# Patient Record
Sex: FEMALE | Race: WHITE
[De-identification: names, ages, dates, MRNs, and addresses within clinical notes are randomized per-mention and may not be internally consistent; named-entity substitution may affect disease eponyms.]

---

## 2017-01-19 ENCOUNTER — HOSPITAL ENCOUNTER (EMERGENCY)
Dept: HOSPITAL 45 - C.EDB | Age: 77
Discharge: HOME | End: 2017-01-19
Payer: COMMERCIAL

## 2017-01-19 VITALS
HEIGHT: 67.01 IN | BODY MASS INDEX: 18.44 KG/M2 | WEIGHT: 117.51 LBS | WEIGHT: 117.51 LBS | BODY MASS INDEX: 18.44 KG/M2 | HEIGHT: 67.01 IN

## 2017-01-19 VITALS — OXYGEN SATURATION: 97 % | HEART RATE: 49 BPM | SYSTOLIC BLOOD PRESSURE: 112 MMHG | DIASTOLIC BLOOD PRESSURE: 60 MMHG

## 2017-01-19 VITALS — TEMPERATURE: 97.34 F

## 2017-01-19 VITALS — OXYGEN SATURATION: 98 %

## 2017-01-19 DIAGNOSIS — R42: ICD-10-CM

## 2017-01-19 DIAGNOSIS — E78.5: ICD-10-CM

## 2017-01-19 DIAGNOSIS — F20.0: ICD-10-CM

## 2017-01-19 DIAGNOSIS — F32.9: ICD-10-CM

## 2017-01-19 DIAGNOSIS — E86.0: Primary | ICD-10-CM

## 2017-01-19 LAB
ALBUMIN/GLOB SERPL: 1.2 {RATIO} (ref 0.9–2)
ALP SERPL-CCNC: 61 U/L (ref 45–117)
ALT SERPL-CCNC: 24 U/L (ref 12–78)
ANION GAP SERPL CALC-SCNC: 10 MMOL/L (ref 3–11)
APPEARANCE UR: CLEAR
AST SERPL-CCNC: 23 U/L (ref 15–37)
BASOPHILS # BLD: 0.02 K/UL (ref 0–0.2)
BASOPHILS NFR BLD: 0.5 %
BILIRUB UR-MCNC: (no result) MG/DL
BUN SERPL-MCNC: 31 MG/DL (ref 7–18)
BUN/CREAT SERPL: 28.5 (ref 10–20)
CALCIUM SERPL-MCNC: 9 MG/DL (ref 8.5–10.1)
CHLORIDE SERPL-SCNC: 108 MMOL/L (ref 98–107)
CKMB/CK RATIO: 1.6 (ref 0–3)
CO2 SERPL-SCNC: 27 MMOL/L (ref 21–32)
COLOR UR: YELLOW
COMPLETE: YES
CREAT CL PREDICTED SERPL C-G-VRATE: 35.5 ML/MIN
CREAT SERPL-MCNC: 1.1 MG/DL (ref 0.6–1.2)
EOSINOPHIL NFR BLD AUTO: 131 K/UL (ref 130–400)
GLOBULIN SER-MCNC: 2.7 GM/DL (ref 2.5–4)
GLUCOSE SERPL-MCNC: 88 MG/DL (ref 70–99)
HCT VFR BLD CALC: 34.3 % (ref 37–47)
IG%: 0 %
IMM GRANULOCYTES NFR BLD AUTO: 26.5 %
LYMPHOCYTES # BLD: 1.07 K/UL (ref 1.2–3.4)
MAGNESIUM SERPL-MCNC: 2.2 MG/DL (ref 1.8–2.4)
MANUAL MICROSCOPIC REQUIRED?: NO
MCH RBC QN AUTO: 31.9 PG (ref 25–34)
MCHC RBC AUTO-ENTMCNC: 33.2 G/DL (ref 32–36)
MCV RBC AUTO: 96.1 FL (ref 80–100)
MONOCYTES NFR BLD: 8.9 %
NEUTROPHILS # BLD AUTO: 1 %
NEUTROPHILS NFR BLD AUTO: 63.1 %
NITRITE UR QL STRIP: (no result)
PH UR STRIP: 6.5 [PH] (ref 4.5–7.5)
PMV BLD AUTO: 11.9 FL (ref 7.4–10.4)
POTASSIUM SERPL-SCNC: 4.1 MMOL/L (ref 3.5–5.1)
RBC # BLD AUTO: 3.57 M/UL (ref 4.2–5.4)
REVIEW REQ?: NO
SODIUM SERPL-SCNC: 145 MMOL/L (ref 136–145)
SP GR UR STRIP: 1.02 (ref 1–1.03)
URINE BILL WITH OR WITHOUT MIC: (no result)
UROBILINOGEN UR-MCNC: (no result) MG/DL
WBC # BLD AUTO: 4.04 K/UL (ref 4.8–10.8)

## 2017-01-19 NOTE — EMERGENCY ROOM VISIT NOTE
History


Report prepared by Courtney:  Siobhan Sancehz


Under the Supervision of:  Dr. Bernadette Milton D.O.


First contact with patient:  07:08


Chief Complaint:  DIZZY


Stated Complaint:  DIZZY,NAUSEA


Nursing Triage Summary:  


Pt states the dizziness started last evening.  Has had this in the past, states 


it was related to dehydration the last time.  Pt states she has been eating and 


drinking normally.





History of Present Illness


The patient is a 77 year old female who presents to the Emergency Room with 

complaints of persistent dizziness that began last evening. It started before 

she went to bed and persisted through the morning. Currently, she is feeling 

slightly better. She complains of slight nausea. She does report slight 

swelling to her bilateral legs. This morning, she ate a waffle and half of a 

grapefruit. The patient has had similar symptoms in the past, but states that 

the dizziness is always due to something different. During her most recent 

presentation to the ER with similar symptoms in December 2015, she was told 

that her symptoms were related to dehydration. Her weight is stable and she has 

been eating and drinking normally. Denies fevers, chills, chest pain, shortness 

of breath, vomiting, or other complaints. The patient does not have any health 

problems other than anxiety.





   Source of History:  patient


   Onset:  last night


   Position:  other (Global)


   Quality:  other (dizzy)


   Timing:  other (persistent)


   Associated Symptoms:  + nausea (slight), No SOB, No chest pain, No chills, 

No fevers, No vomiting





Review of Systems


See HPI for pertinent positives & negatives. A total of 10 systems reviewed and 

were otherwise negative.





Past Medical & Surgical


Medical Problems:


(1) Anemia


(2) Depressive disorder


(3) Dizziness


(4) Dizziness


(5) Hyperlipidemia


(6) Osteoarthrosis


(7) Paranoid Schizophrenia


Surgical Problems:


(1) Cataract Extraction Status


(2) Hip joint replacement status








Family History





FH: heart disease


FHx: cancer





Social History


Smoking Status:  Never Smoker


Alcohol Use:  none


Drug Use:  none


Marital Status:  


Housing Status:  lives alone


Occupation Status:  retired





Current/Historical Medications


Scheduled


Multivitamins/Minerals (Mvi With Minerals), 1 TAB PO DAILY


Risperidone (Risperdal), 1 MG PO HS





Allergies


Uncoded Allergies:  


     DUST MITES (Allergy, Unknown, unknown, 7/15/16)


 pt





Physical Exam


Vital Signs











  Date Time  Temp Pulse Resp B/P Pulse Ox O2 Delivery O2 Flow Rate FiO2


 


1/19/17 10:05  49 18 112/60 97   


 


1/19/17 08:25  47 18 110/50 99 Room Air  


 


1/19/17 07:19  48  108/49    





  49  111/52    





  59  123/52    


 


1/19/17 07:07  56      


 


1/19/17 06:57     98 Room Air  


 


1/19/17 06:50 36.3 52 20 115/63 98 Room Air  











Physical Exam


HEENT: Head - normocephalic and atraumatic   Pupils are equal, round, and 

reactive to light.  Extraocular eye muscles are intact, and sclera are 

anicteric.   Nose -  moist nasal mucosa without discharge. Mouth - dry buccal 

mucosa.  Oropharynx is nonerythematous and there is no tonsillar exudate or 

edema noted.


Neck: Supple; no JVD, nuchal rigidity, cervical lymphadenopathy.


Heart: Bradycardic rate and regular rhythm.  There is a normal S1 and S2 with 

no murmurs, clicks, or gallops appreciated.


Lungs: Clear to auscultation bilaterally with no wheezes, rales, or rhonchi.


Abdomen: Soft, completely nontender, nondistended, with good bowel sounds.  

There are no palpable pulsatile masses or hepatosplenomegaly.  There is no 

guarding, rigidity, or rebound noted.


Extremities: No evidence of cyanosis, clubbing, or edema.  There are easily 

palpable peripheral pulses.


Skin: warm and dry with poor turgor and no rashes.





Medical Decision & Procedures


Laboratory Results


1/19/17 07:10








Red Blood Count 3.57, Mean Corpuscular Volume 96.1, Mean Corpuscular Hemoglobin 

31.9, Mean Corpuscular Hemoglobin Concent 33.2, Mean Platelet Volume 11.9, 

Neutrophils (%) (Auto) 63.1, Lymphocytes (%) (Auto) 26.5, Monocytes (%) (Auto) 

8.9, Eosinophils (%) (Auto) 1.0, Basophils (%) (Auto) 0.5, Neutrophils # (Auto) 

2.55, Lymphocytes # (Auto) 1.07, Monocytes # (Auto) 0.36, Eosinophils # (Auto) 

0.04, Basophils # (Auto) 0.02





1/19/17 07:10

















Test


  1/19/17


07:10 1/19/17


08:25


 


White Blood Count


  4.04 K/uL


(4.8-10.8) 


 


 


Red Blood Count


  3.57 M/uL


(4.2-5.4) 


 


 


Hemoglobin


  11.4 g/dL


(12.0-16.0) 


 


 


Hematocrit 34.3 % (37-47)  


 


Mean Corpuscular Volume


  96.1 fL


() 


 


 


Mean Corpuscular Hemoglobin


  31.9 pg


(25-34) 


 


 


Mean Corpuscular Hemoglobin


Concent 33.2 g/dl


(32-36) 


 


 


Platelet Count


  131 K/uL


(130-400) 


 


 


Mean Platelet Volume


  11.9 fL


(7.4-10.4) 


 


 


Neutrophils (%) (Auto) 63.1 %  


 


Lymphocytes (%) (Auto) 26.5 %  


 


Monocytes (%) (Auto) 8.9 %  


 


Eosinophils (%) (Auto) 1.0 %  


 


Basophils (%) (Auto) 0.5 %  


 


Neutrophils # (Auto)


  2.55 K/uL


(1.4-6.5) 


 


 


Lymphocytes # (Auto)


  1.07 K/uL


(1.2-3.4) 


 


 


Monocytes # (Auto)


  0.36 K/uL


(0.11-0.59) 


 


 


Eosinophils # (Auto)


  0.04 K/uL


(0-0.5) 


 


 


Basophils # (Auto)


  0.02 K/uL


(0-0.2) 


 


 


RDW Standard Deviation


  51.5 fL


(36.4-46.3) 


 


 


RDW Coefficient of Variation


  14.6 %


(11.5-14.5) 


 


 


Immature Granulocyte % (Auto) 0.0 %  


 


Immature Granulocyte # (Auto)


  0.00 K/uL


(0.00-0.02) 


 


 


Anion Gap


  10.0 mmol/L


(3-11) 


 


 


Est Creatinine Clear Calc


Drug Dose 35.5 ml/min 


  


 


 


Estimated GFR (


American) 55.7 


  


 


 


Estimated GFR (Non-


American 48.1 


  


 


 


BUN/Creatinine Ratio 28.5 (10-20)  


 


Calcium Level


  9.0 mg/dl


(8.5-10.1) 


 


 


Magnesium Level


  2.2 mg/dl


(1.8-2.4) 


 


 


Total Bilirubin


  0.3 mg/dl


(0.2-1) 


 


 


Aspartate Amino Transf


(AST/SGOT) 23 U/L (15-37) 


  


 


 


Alanine Aminotransferase


(ALT/SGPT) 24 U/L (12-78) 


  


 


 


Alkaline Phosphatase


  61 U/L


() 


 


 


Total Creatine Kinase


  51 U/L


() 


 


 


Creatine Kinase MB


  0.8 ng/ml


(0.5-3.6) 


 


 


Creatine Kinase MB Ratio 1.6 (0-3.0)  


 


Troponin I


  < 0.015 ng/ml


(0-0.045) 


 


 


Total Protein


  6.0 gm/dl


(6.4-8.2) 


 


 


Albumin


  3.3 gm/dl


(3.4-5.0) 


 


 


Globulin


  2.7 gm/dl


(2.5-4.0) 


 


 


Albumin/Globulin Ratio 1.2 (0.9-2)  


 


Urine Color  YELLOW 


 


Urine Appearance  CLEAR (CLEAR) 


 


Urine pH  6.5 (4.5-7.5) 


 


Urine Specific Gravity


  


  1.019


(1.000-1.030)


 


Urine Protein  NEG (NEG) 


 


Urine Glucose (UA)  NEG (NEG) 


 


Urine Ketones  NEG (NEG) 


 


Urine Occult Blood  NEG (NEG) 


 


Urine Nitrite  NEG (NEG) 


 


Urine Bilirubin  NEG (NEG) 


 


Urine Urobilinogen  NEG (NEG) 


 


Urine Leukocyte Esterase  NEG (NEG) 





Laboratory results per my review.





Medications Administered











 Medications


  (Trade)  Dose


 Ordered  Sig/Doreen


 Route  Start Time


 Stop Time Status Last Admin


Dose Admin


 


 Sodium Chloride  1,000 ml @ 


 999 mls/hr  Q1H1M STAT


 IV  1/19/17 07:56


 1/19/17 08:56 DC 1/19/17 08:13


999 MLS/HR


 


 Sodium Chloride


  (Nss 500ml)  500 ml @ 


 999 mls/hr  Q31M STAT


 IV  1/19/17 08:31


 1/19/17 09:01 DC 1/19/17 09:05


999 MLS/HR











Procedure


Medications: NSS 1000 ml @ 999 mls/hr IV,  ml @ 999 mls/hr IV





ECG


Indication:  other (dizzy)


Rate (beats per minute):  50


Rhythm:  sinus bradycardia


Findings:  T-wave inversion (Anterior), no ectopy


Comparison ECG Date:  12/31/15


Change:  no significant change





ED Course


0720: The patient was evaluated in room A3. A complete history and physical 

examination were performed. Nursing notes and previous electronic medical 

records were reviewed.  IV lock was established and labs were drawn as above.





0756: Ordered NSS 1000 ml @ 999 mls/hr IV,  ml @ 999 mls/hr IV. 





0855: I reassessed the patient. She was feeling much better and was still 

getting fluids and drinking cranberry juice. Her heart rate was 42. I looked at 

previous visits and she is always bradycardic. 





0948: Upon reevaluation, the patient was doing great. I discussed findings and 

results with her. She verbalized agreement of the treatment plan. The patient 

was discharged home.





Medical Decision


The patient is a 77 year old female who presents to the ED with dizziness. 

Differential diagnosis includes vertigo, dehydration, cardiac dysrhythmia, 

intracranial process.





Labs: White count of 4.0, hemoglobin 11.4, BUN 31, creatinine 1.1 consistent 

with significant dehydration, normal LFTs and magnesium, glucose 88, urinalysis 

is normal. 





The patient has a history of episodes of dehydration and dizziness for which 

she has been in the hospital before.





She has an elevated BUN/creatinine ratio.  After IV crystalloid therapy and 

oral fluids, the patient seems to be feeling much better.  She'll be discharged 

home to follow-up with her PCP if she has any persistent dizziness.





Impression





 Primary Impression:  


 Dehydration


 Additional Impression:  


 Dizziness





Scribe Attestation


The scribe's documentation has been prepared under my direction and personally 

reviewed by me in its entirety. I confirm that the note above accurately 

reflects all work, treatment, procedures, and medical decision making performed 

by me.





Departure Information


Dispostion


Home / Self-Care





Referrals


Tyler Hamilton III, CRNP (PCP)





Patient Instructions


ED Dehydration, ED Dizziness [facility], [facility], [facility]





Additional Instructions





Rest.





take plenty of clear liquids





If dizziness persists, follow up with PCP tomorrow





Problem Qualifiers

## 2017-09-26 ENCOUNTER — HOSPITAL ENCOUNTER (OUTPATIENT)
Dept: HOSPITAL 45 - C.LAB1850 | Age: 77
Discharge: HOME | End: 2017-09-26
Attending: STUDENT IN AN ORGANIZED HEALTH CARE EDUCATION/TRAINING PROGRAM
Payer: COMMERCIAL

## 2017-09-26 DIAGNOSIS — Z00.00: Primary | ICD-10-CM

## 2017-09-26 DIAGNOSIS — E78.5: ICD-10-CM

## 2017-09-26 DIAGNOSIS — D64.9: ICD-10-CM

## 2017-09-26 LAB
BASOPHILS # BLD: 0.01 K/UL (ref 0–0.2)
BASOPHILS NFR BLD: 0.2 %
CHOLEST/HDLC SERPL: 3 {RATIO}
COMPLETE: YES
EOSINOPHIL NFR BLD AUTO: 146 K/UL (ref 130–400)
GLUCOSE SERPL-MCNC: 86 MG/DL (ref 70–99)
GLUCOSE UR QL: 84 MG/DL
HCT VFR BLD CALC: 37.1 % (ref 37–47)
IG%: 0 %
IMM GRANULOCYTES NFR BLD AUTO: 36.2 %
KETONES UR QL STRIP: 156 MG/DL
LYMPHOCYTES # BLD: 1.51 K/UL (ref 1.2–3.4)
MCH RBC QN AUTO: 31.1 PG (ref 25–34)
MCHC RBC AUTO-ENTMCNC: 32.3 G/DL (ref 32–36)
MCV RBC AUTO: 96.1 FL (ref 80–100)
MONOCYTES NFR BLD: 9.4 %
NEUTROPHILS # BLD AUTO: 2.2 %
NEUTROPHILS NFR BLD AUTO: 52 %
NITRITE UR QL STRIP: 81 MG/DL (ref 0–150)
PH UR: 256 MG/DL (ref 0–200)
PMV BLD AUTO: 11.7 FL (ref 7.4–10.4)
RBC # BLD AUTO: 3.86 M/UL (ref 4.2–5.4)
VERY LOW DENSITY LIPOPROT CALC: 16 MG/DL
WBC # BLD AUTO: 4.17 K/UL (ref 4.8–10.8)

## 2018-03-16 ENCOUNTER — HOSPITAL ENCOUNTER (OUTPATIENT)
Dept: HOSPITAL 45 - C.MAMM | Age: 78
Discharge: HOME | End: 2018-03-16
Attending: FAMILY MEDICINE
Payer: COMMERCIAL

## 2018-03-16 DIAGNOSIS — Z12.31: Primary | ICD-10-CM

## 2018-03-19 NOTE — MAMMOGRAPHY REPORT
BILATERAL DIGITAL SCREENING MAMMOGRAM TOMOSYNTHESIS WITH CAD: 3/16/2018

CLINICAL HISTORY: Routine screening.  





TECHNIQUE:  Breast tomosynthesis in addition to standard 2D mammography was performed. Current study 
was also evaluated with a Computer Aided Detection (CAD) system.  



COMPARISON: Comparison is made to exams dated:  6/5/2015 mammogram, 1/22/2014 mammogram, 1/17/2012 ma
mmogram - [facility], and 5/16/2008.   



BREAST COMPOSITION:  The tissue of both breasts is heterogeneously dense, which may obscure small mas
ses.  



FINDINGS:  No suspicious masses, calcifications, or areas of architectural distortion are noted in ei
ther breast. There has been no significant interval change compared to prior exams.  



IMPRESSION:  ACR BI-RADS CATEGORY 1: NEGATIVE

There is no mammographic evidence of malignancy. A 1 year screening mammogram is recommended.  The pa
tient will receive written notification of the results.  





Approximately 10% of breast cancers are not detected with mammography. A negative mammographic report
 should not delay biopsy if a clinically suggestive mass is present.



Breana Shaffer M.D.          

ah/:3/16/2018 13:47:29  



Imaging Technologist: La BLACKWELL(R)(M), [facility]

letter sent: Normal 1/2  

BI-RADS Code: ACR BI-RADS Category 1: Negative

## 2018-07-23 ENCOUNTER — HOSPITAL ENCOUNTER (OUTPATIENT)
Dept: HOSPITAL 45 - C.LAB1850 | Age: 78
Discharge: HOME | End: 2018-07-23
Attending: INTERNAL MEDICINE
Payer: COMMERCIAL

## 2018-07-23 DIAGNOSIS — E78.5: Primary | ICD-10-CM

## 2018-07-23 DIAGNOSIS — E03.9: ICD-10-CM

## 2018-07-23 LAB
ALBUMIN SERPL-MCNC: 3.4 GM/DL (ref 3.4–5)
ALP SERPL-CCNC: 55 U/L (ref 45–117)
ALT SERPL-CCNC: 20 U/L (ref 12–78)
AST SERPL-CCNC: 20 U/L (ref 15–37)
BASOPHILS # BLD: 0.02 K/UL (ref 0–0.2)
BASOPHILS NFR BLD: 0.4 %
BUN SERPL-MCNC: 26 MG/DL (ref 7–18)
CALCIUM SERPL-MCNC: 8.7 MG/DL (ref 8.5–10.1)
CO2 SERPL-SCNC: 32 MMOL/L (ref 21–32)
CREAT SERPL-MCNC: 1.19 MG/DL (ref 0.6–1.2)
EOS ABS #: 0.09 K/UL (ref 0–0.5)
EOSINOPHIL NFR BLD AUTO: 128 K/UL (ref 130–400)
GLUCOSE SERPL-MCNC: 78 MG/DL (ref 70–99)
HCT VFR BLD CALC: 36.7 % (ref 37–47)
HGB BLD-MCNC: 11.8 G/DL (ref 12–16)
IG#: 0.01 K/UL (ref 0–0.02)
IMM GRANULOCYTES NFR BLD AUTO: 32.2 %
KETONES UR QL STRIP: 151 MG/DL
LYMPHOCYTES # BLD: 1.66 K/UL (ref 1.2–3.4)
MCH RBC QN AUTO: 31.4 PG (ref 25–34)
MCHC RBC AUTO-ENTMCNC: 32.2 G/DL (ref 32–36)
MCV RBC AUTO: 97.6 FL (ref 80–100)
MONO ABS #: 0.67 K/UL (ref 0.11–0.59)
MONOCYTES NFR BLD: 13 %
NEUT ABS #: 2.7 K/UL (ref 1.4–6.5)
NEUTROPHILS # BLD AUTO: 1.7 %
NEUTROPHILS NFR BLD AUTO: 52.5 %
PH UR: 239 MG/DL (ref 0–200)
PMV BLD AUTO: 12.5 FL (ref 7.4–10.4)
POTASSIUM SERPL-SCNC: 4.1 MMOL/L (ref 3.5–5.1)
PROT SERPL-MCNC: 6.6 GM/DL (ref 6.4–8.2)
RED CELL DISTRIBUTION WIDTH CV: 14.2 % (ref 11.5–14.5)
RED CELL DISTRIBUTION WIDTH SD: 50.2 FL (ref 36.4–46.3)
SODIUM SERPL-SCNC: 141 MMOL/L (ref 136–145)
WBC # BLD AUTO: 5.15 K/UL (ref 4.8–10.8)